# Patient Record
Sex: FEMALE | Race: WHITE | NOT HISPANIC OR LATINO | URBAN - METROPOLITAN AREA
[De-identification: names, ages, dates, MRNs, and addresses within clinical notes are randomized per-mention and may not be internally consistent; named-entity substitution may affect disease eponyms.]

---

## 2024-07-18 ENCOUNTER — HOSPITAL ENCOUNTER (EMERGENCY)
Facility: CLINIC | Age: 39
Discharge: OTHER INSTITUTION | End: 2024-07-18
Attending: EMERGENCY MEDICINE | Admitting: EMERGENCY MEDICINE
Payer: COMMERCIAL

## 2024-07-18 VITALS
RESPIRATION RATE: 18 BRPM | WEIGHT: 108 LBS | TEMPERATURE: 97.6 F | HEIGHT: 62 IN | DIASTOLIC BLOOD PRESSURE: 52 MMHG | OXYGEN SATURATION: 100 % | SYSTOLIC BLOOD PRESSURE: 89 MMHG | HEART RATE: 59 BPM | BODY MASS INDEX: 19.88 KG/M2

## 2024-07-18 DIAGNOSIS — F14.10 COCAINE ABUSE (H): ICD-10-CM

## 2024-07-18 DIAGNOSIS — F11.20 OPIOID USE DISORDER, SEVERE, DEPENDENCE (H): ICD-10-CM

## 2024-07-18 PROCEDURE — 99282 EMERGENCY DEPT VISIT SF MDM: CPT | Performed by: EMERGENCY MEDICINE

## 2024-07-18 PROCEDURE — 99283 EMERGENCY DEPT VISIT LOW MDM: CPT | Performed by: EMERGENCY MEDICINE

## 2024-07-18 ASSESSMENT — COLUMBIA-SUICIDE SEVERITY RATING SCALE - C-SSRS
1. IN THE PAST MONTH, HAVE YOU WISHED YOU WERE DEAD OR WISHED YOU COULD GO TO SLEEP AND NOT WAKE UP?: NO
2. HAVE YOU ACTUALLY HAD ANY THOUGHTS OF KILLING YOURSELF IN THE PAST MONTH?: NO
6. HAVE YOU EVER DONE ANYTHING, STARTED TO DO ANYTHING, OR PREPARED TO DO ANYTHING TO END YOUR LIFE?: NO

## 2024-07-19 NOTE — ED TRIAGE NOTES
Patient presents seeking detox from heroin and fentanyl and crack sometimes. Patient was supposed to report to [facility] for treatment today. Patient was told she would have to seek detox prior to admission to treatment. Patient is currently snorts and shoots about 5 times a day. Patient reports history seizures; last seizure 1 year ago. Patient vaping nicotine daily. Patient last used 2 hours prior to arrival     Triage Assessment (Adult)       Row Name 07/18/24 1915          Triage Assessment    Airway WDL WDL        Respiratory WDL    Respiratory WDL WDL        Skin Circulation/Temperature WDL    Skin Circulation/Temperature WDL WDL        Cardiac WDL    Cardiac WDL WDL        Peripheral/Neurovascular WDL    Peripheral Neurovascular WDL WDL        Cognitive/Neuro/Behavioral WDL    Cognitive/Neuro/Behavioral WDL WDL

## 2024-07-19 NOTE — ED PROVIDER NOTES
"    [facility] EMERGENCY DEPARTMENT ([facility])    7/18/24       ED PROVIDER NOTE       History     Chief Complaint   Patient presents with    Drug / Alcohol Assessment     Patient presents seeking detox from heroin and fentanyl and crack sometimes. Patient was supposed to report to [facility] for treatment today. Patient was told she would have to seek detox prior to admission to treatment. Patient is currently snorts and shoots about 5 times a day. Patient reports history seizures; last seizure 1 year ago. Patient vaping nicotine daily.      HPI    Re Dc is a 38 year old female who presents to the Emergency Department today requesting detox.  Patient states that she needs detox from Xylazine which she believes probably also contains fentanyl.  She has been using \"6 bags a day\" of xylazine and fentanyl for about the past 3 months.  She sometimes snorts it and sometimes shoots it.  Last use was about 2 hours prior to arrival.  She also uses crack cocaine and will use about 6 times per day, she will snort it generally.  Occasionally she will smoke it.  Patient reports that she had 4 months of sobriety but then relapsed and has been using for the past 3 months.  She tried to get into a treatment program through [facility] today and she was told that she would need detox.  They told her that she could potentially go to various outpatient programs but they also gave her the number of [place] and told her to come here for inpatient detox.  Patient denies use of benzos and denies any alcohol use.    Patient admits to anxiety and depression, denies any medication, denies any SI or HI.  No auditory hallucinations.      Past Medical History  No past medical history on file.  No past surgical history on file.  No current outpatient medications on file.    Allergies   Allergen Reactions    Sulfa Antibiotics      Family History  No family history on file.  Social History       Past medical history, past " "surgical history, medications, allergies, family history, and social history were reviewed with the patient. No additional pertinent items.     A medically appropriate review of systems was performed with pertinent positives and negatives noted in the HPI, and all other systems negative.    Physical Exam   BP: (!) 79/50  Pulse: 59  Temp: 97.6  F (36.4  C)  Resp: 18  Height: 157.5 cm (5' 2\")  Weight: 49 kg (108 lb)  SpO2: 100 %  Physical Exam  Vitals and nursing note reviewed.   Constitutional:       General: She is not in acute distress.     Appearance: She is not diaphoretic.      Comments: Adult female, sitting back in bed, intermittently nodding off, will immediately wake up and continues to interact appropriately.  No acute distress.   HENT:      Head: Atraumatic.      Mouth/Throat:      Mouth: Mucous membranes are moist.      Pharynx: Oropharynx is clear. No oropharyngeal exudate.   Eyes:      General: No scleral icterus.     Pupils: Pupils are equal, round, and reactive to light.   Cardiovascular:      Rate and Rhythm: Normal rate.      Pulses: Normal pulses.      Heart sounds: Normal heart sounds. No murmur heard.  Pulmonary:      Effort: No respiratory distress.      Breath sounds: Normal breath sounds.   Abdominal:      General: Bowel sounds are normal.      Palpations: Abdomen is soft.      Tenderness: There is no abdominal tenderness.   Musculoskeletal:         General: No tenderness.   Skin:     General: Skin is warm.      Findings: No rash.   Neurological:      Mental Status: She is alert.   Psychiatric:      Comments: Patient is awake, intermittently nodding off, she will immediately arouse.  Not agitated or aggressive.  Somewhat flat affect.  No SI/HI.  No AH.         ED Course, Procedures, & Data      Procedures               No results found for any visits on 07/18/24.  Medications - No data to display  Labs Ordered and Resulted from Time of ED Arrival to Time of ED Departure - No data to " display  No orders to display          Critical care was not performed.     Medical Decision Making  The patient's presentation was of high complexity (a chronic illness severe exacerbation, progression, or side effect of treatment).    The patient's evaluation involved:  history and exam without other MDM data elements    The patient's management necessitated moderate risk (patient did present hypotensive, asymptomatic with this, allowed 1 blood pressure recheck but would not wait to see if this came up with fluids).    Assessment & Plan    Patient presents for the above complaints.  On my evaluation she appears somewhat sleepy, clearly under the influence.  She is falling asleep during exam.  She is answering questions, however her family member who is with her is answering most of the questions.    Initial blood pressure is 79/50.  Pulse is 59.  She denies any dizziness, nausea/vomiting/diarrhea, or other physical complaints at this time.  No fevers, no other complaints.  Suspect that her hypotension is probably from her recent substance use/CNS depressant effect of substances.  We did repeat her blood pressure here in the emergency department and this is 89/52.  She does not feel dizzy and does not have any complaints.  We did give her some water here to drink, she is up and ambulating.  She is not interested in staying to have us recheck the blood pressure again in several minutes.    I did tell the patient that we cannot detox from opiates on an inpatient basis here.  Her mom objected a couple of times, stating that they were told to come here and that they called here and they were told they could come here for inpatient opiate detox.  I did tell them that we do not admit for inpatient opiate detox any longer.  I did recommend outpatient programs, she reports that [facility] was mentioned to her.  I did offer to call [facility] or [facility] for her, she states she would rather do it herself.  I will give her  those phone numbers, will also give her the phone number to the addiction medicine clinic/bridge clinic.    Again, we did recommend that the patient drink some oral fluids and we recheck her blood pressure, but she insists that she wants to go.  She is asymptomatic.    I have reviewed the nursing notes. I have reviewed the findings, diagnosis, plan and need for follow up with the patient.    There are no discharge medications for this patient.      Final diagnoses:   Opioid use disorder, severe, dependence (H)   Cocaine abuse (H)   I, Katty Donald, am serving as a trained medical scribe to document services personally performed by Bernadette Harrison MD based on the provider's statements to me on July 18, 2024.  This document has been checked and approved by the attending provider.    I, Bernadette Harrison MD, was physically present and have reviewed and verified the accuracy of this note documented by Katty Donald, medical scribe.      Bernadette Harrison MD  [facility] EMERGENCY DEPARTMENT  7/18/2024     Bernadette Harrison MD  07/18/24 2006

## 2024-07-19 NOTE — DISCHARGE INSTRUCTIONS
You have been seen in the emergency department today for detox.  The hospital does not do inpatient detox for opiates.  We recommend that you follow-up with an outpatient program, see resources below.    We have also given you the phone number to the addiction medicine clinic which is located here at [facility], please see their phone number below.    Addiction Medicine Clinic:    [phone]    The addiction medicine clinic does have walk-in hours Monday through Friday.      Substance Use Disorder Direct Access Resources - Community Resources    It is recommended that you abstain from all mood altering chemicals. Please contact the sober support hotline ([phone]) as needed; phones are answered 24 hours a day, 7 days a week.    To access substance use treatment you must have a comprehensive assessment completed to begin any treatment program.     If uninsured, please contact your county of residence for eligibility screen to substance use disorder evaluation and treatment:    [place] - [phone]   [facility] [phone]   [facility] [phone]   [place] - [phone]   [facility] [phone]   [facility] [phone]   [facility] [phone]   [place] - [phone]     If you have private insurance, call the customer service number on the back of your insurance card to find an in-network substance abuse use disorder assessment. The ideal provider will be a treatment facility, licensed in the [facility].     Community LELE Evaluations: Clients may call their county for a full list of providers - Availability and services listed belo are subject to change, please call the provider to confirm    [facility]  [phone] [address], [place], [address]  *Please call the above number to schedule a comprehensive assessment for determination of level of care needs. In person and virtual appointments available Mon-Fri.    [facility], [address], First Floor, [address], [place] [address] (next to the outpatient lab)    Phone: [phone]   Provides bridging services to people with Opiate Use Disorders (OUD) seeking care. This is a front door to Medication Assisted Treatments (MAT), ages 16+  Walk In hours: Monday-Friday 9:00am-3:00pm    [facility]  [phone]  Walk in Assessments: Mon-Friday 7a-1:45p  [address], [place], [address]    [facility]  Central Access [phone]  [address] [place], [place], [address]  *by appointment only    [facility]  [phone] (phone consultation available )  Locations in: [place], [facility], and [place], [place]  [place] virtual IOP [phone] or visit [email or web address]   Also offers LGBTQ programming     [facility]  [phone]  [address]  [place], [place], [address]  *Currently only offered via telehealth - call to set up an appointment    [facility]  [address] [place] [address], [address]  [facility]  For more information to to make a referral call:  [phone]  Walk-in on   9-11 a.m.    [facility]  [phone]  [address] [place], [place], [address]  *available by appointments only    [facility] specific  [phone]  [address] [place], [place], [address]  *available by appointment only    [facility]  [phone]  [address] [place], [place], [address]  *walk in assessments available M-F starting at 7 am.    [facility]  [phone]  Locations: [facility], [facility], and [place]  *Walk in assessments availble M-F starting at 8 am -virtual only    [facility]  [phone]  [address] [place] [address]    [facility]  Virtual + Locations: [place], [place], [place], [place], [facility], [facility],  [facility]   [phone]  *available by appointment only    [facility]  [phone]  [address]  [place], [place], [address]    [facility]  [phone]  [address] [place], [place], [address]  *available by appointment    [facility]  [phone]  [address]  [place], [place], [address]  *walk ins available M-TH from 9-3    [facility] program: [phone]  [address] [place], [place], [address]    [place]  [phone]  Same day substance use disorder assessments are available Monday - Friday, via walk-in or by appointment at the [place] location.  [facility] [employer], [address], [place] [address]     [facility] - adolescent and adult SUDs services  [phone]  Offer services Monday through Friday, as well as evening hours Monday through Thursday. Normally, a first appointment will be scheduled within one week  [email or web address]  Locations all over [place]    If you are intoxicated, you may be required to detox at a detox facility before starting treatment. The following are detox facilities that you can self present to. All detox facilities are able to help you complete an assessment prior to discharge if you choose:      [facility]: [address] [place], [place], [address].         [phone]    [facility]: [address] [place], [place], [address]  [phone]     [facility]): [address] [place], [place], [address]  [phone]     [facility]: [address], [place], [address], [phone]         Ways to help cope with sobriety:    -- Take prescribed medicines as scheduled  -- Keep follow-up appointments  -- Talk to others about your concerns  -- Get regular exercise  -- Practice deep breathing skills  -- Eat a healthy diet  -- Use community resources, including hotline numbers, [facility] and  support meetings  -- Stay sober and avoid places/people/things associated with substance use  --Maintain a daily schedule/routine  --Get at least 7-8 hours of sleep per night  --Create a list 10--20 healthy activities that you can do that are enjoyable and do not involve substance use  --Create daily goals (approx. 1-4 goals) per day and work to achieve them throughout the day.       Free Resources:    [facility] ([facility])  [facility] connects people seeking recovery to resources that help foster and sustain long-term recovery. Whether you are seeking resources for treatment, transportation, housing, job training, education, health care or other pathways to recovery, [facility] is a great place to start.  Phone: 110.483.2843. www.[place]"[employer] (Great listing of all types of recovery and non-recovery related resources)    Alcoholics Anonymous  Phone: 6-535-ALCOHOL  Website: HTTP://WWW.AA.ORG/  [facility] [place] (708-841-3365 or http://aaMedaxion.org)  [facility] [place] (566-732-8628 or www.aasaul.org)     Narcotics Anonymous  Phone: 753.796.6763  Website: www.[employer].    [facility] [address], [place], [place],  Phone: [phone]  Drop-in Hours: Monday-Friday 9-11:30 am. By appointment at other times.  Provides: [facility] is a drop-in center on the [place] [address] [facility] that provides a safe space for individuals who are homeless and have a history of chemical use. Sobriety is not a requirement but drugs and alcohol are not allowed on the property.  Services: Non-clients can access drop-in services such as Recovery and Harm Reduction Groups, referrals to case management, community activities, shower facilities, and a pool table. Individuals who are homeless and have chemical health needs may be eligible for enrollment into [facility]'s case management program. Clients and  work together to access benefits, treatment, health care, shelter, and  external housing resources.

## 2024-11-20 ENCOUNTER — HOSPITAL ENCOUNTER (EMERGENCY)
Facility: CLINIC | Age: 39
Discharge: HOME | End: 2024-11-20
Attending: STUDENT IN AN ORGANIZED HEALTH CARE EDUCATION/TRAINING PROGRAM | Admitting: STUDENT IN AN ORGANIZED HEALTH CARE EDUCATION/TRAINING PROGRAM
Payer: COMMERCIAL

## 2024-11-20 VITALS
SYSTOLIC BLOOD PRESSURE: 145 MMHG | DIASTOLIC BLOOD PRESSURE: 104 MMHG | TEMPERATURE: 98.3 F | RESPIRATION RATE: 18 BRPM | OXYGEN SATURATION: 99 % | HEART RATE: 104 BPM

## 2024-11-20 DIAGNOSIS — L03.012 PARONYCHIA OF LEFT THUMB: ICD-10-CM

## 2024-11-20 PROCEDURE — 99284 EMERGENCY DEPT VISIT MOD MDM: CPT | Mod: 25 | Performed by: STUDENT IN AN ORGANIZED HEALTH CARE EDUCATION/TRAINING PROGRAM

## 2024-11-20 PROCEDURE — 99284 EMERGENCY DEPT VISIT MOD MDM: CPT | Performed by: STUDENT IN AN ORGANIZED HEALTH CARE EDUCATION/TRAINING PROGRAM

## 2024-11-20 PROCEDURE — 250N000009 HC RX 250: Performed by: STUDENT IN AN ORGANIZED HEALTH CARE EDUCATION/TRAINING PROGRAM

## 2024-11-20 PROCEDURE — 10060 I&D ABSCESS SIMPLE/SINGLE: CPT | Performed by: STUDENT IN AN ORGANIZED HEALTH CARE EDUCATION/TRAINING PROGRAM

## 2024-11-20 PROCEDURE — 250N000013 HC RX MED GY IP 250 OP 250 PS 637: Performed by: STUDENT IN AN ORGANIZED HEALTH CARE EDUCATION/TRAINING PROGRAM

## 2024-11-20 RX ORDER — CEPHALEXIN 500 MG/1
500 CAPSULE ORAL ONCE
Status: COMPLETED | OUTPATIENT
Start: 2024-11-20 | End: 2024-11-20

## 2024-11-20 RX ORDER — DOXYCYCLINE 100 MG/1
100 CAPSULE ORAL 2 TIMES DAILY
Qty: 20 CAPSULE | Refills: 0 | Status: SHIPPED | OUTPATIENT
Start: 2024-11-20 | End: 2024-11-30

## 2024-11-20 RX ORDER — IBUPROFEN 600 MG/1
600 TABLET, FILM COATED ORAL EVERY 6 HOURS PRN
Qty: 120 TABLET | Refills: 0 | Status: SHIPPED | OUTPATIENT
Start: 2024-11-20 | End: 2024-12-20

## 2024-11-20 RX ORDER — DOXYCYCLINE 100 MG/1
100 CAPSULE ORAL ONCE
Status: COMPLETED | OUTPATIENT
Start: 2024-11-20 | End: 2024-11-20

## 2024-11-20 RX ORDER — IBUPROFEN 600 MG/1
600 TABLET, FILM COATED ORAL ONCE
Status: COMPLETED | OUTPATIENT
Start: 2024-11-20 | End: 2024-11-20

## 2024-11-20 RX ORDER — CEPHALEXIN 500 MG/1
500 CAPSULE ORAL 4 TIMES DAILY
Qty: 40 CAPSULE | Refills: 0 | Status: SHIPPED | OUTPATIENT
Start: 2024-11-20 | End: 2024-11-30

## 2024-11-20 RX ADMIN — DOXYCYCLINE HYCLATE 100 MG: 100 CAPSULE ORAL at 21:03

## 2024-11-20 RX ADMIN — CEPHALEXIN 500 MG: 500 CAPSULE ORAL at 21:03

## 2024-11-20 RX ADMIN — LIDOCAINE HYDROCHLORIDE 10 ML: 10 INJECTION, SOLUTION EPIDURAL; INFILTRATION; INTRACAUDAL; PERINEURAL at 21:04

## 2024-11-20 RX ADMIN — IBUPROFEN 600 MG: 600 TABLET, FILM COATED ORAL at 21:03

## 2024-11-20 ASSESSMENT — COLUMBIA-SUICIDE SEVERITY RATING SCALE - C-SSRS
6. HAVE YOU EVER DONE ANYTHING, STARTED TO DO ANYTHING, OR PREPARED TO DO ANYTHING TO END YOUR LIFE?: NO
1. IN THE PAST MONTH, HAVE YOU WISHED YOU WERE DEAD OR WISHED YOU COULD GO TO SLEEP AND NOT WAKE UP?: NO
2. HAVE YOU ACTUALLY HAD ANY THOUGHTS OF KILLING YOURSELF IN THE PAST MONTH?: NO

## 2024-11-20 ASSESSMENT — ACTIVITIES OF DAILY LIVING (ADL): ADLS_ACUITY_SCORE: 0

## 2024-11-21 NOTE — ED TRIAGE NOTES
Pt said they noticed thumb swelling today and thumb appears w ingrown nail, also having nose swelling     Triage Assessment (Adult)       Row Name 11/20/24 1944          Triage Assessment    Airway WDL WDL        Respiratory WDL    Respiratory WDL WDL        Skin Circulation/Temperature WDL    Skin Circulation/Temperature WDL WDL        Cardiac WDL    Cardiac WDL WDL        Peripheral/Neurovascular WDL    Peripheral Neurovascular WDL WDL        Cognitive/Neuro/Behavioral WDL    Cognitive/Neuro/Behavioral WDL WDL

## 2024-11-21 NOTE — ED PROVIDER NOTES
[place] EMERGENCY DEPARTMENT ([facility])    11/20/24       ED PROVIDER NOTE       History     Chief Complaint   Patient presents with    Wound Check     HPI  Re Dc is a 39 year old female with a history of polysubstance use who presents to the ED with left thumb pain.     Patient reports wearing fake nails and noticing left thumb swelling and pain yesterday. Patient denies daily medications.  Denies fevers, chills, nausea vomiting or any other symptoms    Past Medical History  No past medical history on file.  No past surgical history on file.  No current outpatient medications on file.    Allergies   Allergen Reactions    Sulfa Antibiotics      Family History  No family history on file.  Social History       Past medical history, past surgical history, medications, allergies, family history, and social history were reviewed with the patient. No additional pertinent items.     A complete review of systems was performed with pertinent positives and negatives noted in the HPI, and all other systems negative.    Physical Exam   BP: (!) 145/104  Pulse: 104  Temp: 98.3  F (36.8  C)  Resp: 18  SpO2: 99 %  Physical Exam  Vitals and nursing note reviewed.   Constitutional:       General: She is not in acute distress.     Appearance: Normal appearance. She is not diaphoretic.   HENT:      Head: Atraumatic.      Mouth/Throat:      Mouth: Mucous membranes are moist.   Eyes:      General: No scleral icterus.     Conjunctiva/sclera: Conjunctivae normal.   Cardiovascular:      Rate and Rhythm: Normal rate.      Heart sounds: Normal heart sounds.   Pulmonary:      Effort: No respiratory distress.      Breath sounds: Normal breath sounds.   Abdominal:      General: Abdomen is flat.   Musculoskeletal:      Cervical back: Neck supple.      Comments: Left thumb with paronychia, pus under skin, distally intact neurovascularly, able to range thumb   Skin:     General: Skin is warm.      Findings: No rash.    Neurological:      Mental Status: She is alert.           ED Course, Procedures, & Data      [facility]    PROCEDURE: -Incision/Drainage    Date/Time: 11/20/2024 9:01 PM    Performed by: Moe Osorio MD  Authorized by: Moe Osorio MD    Risks, benefits and alternatives discussed.      LOCATION:      Type:  Abscess    Location:  Upper extremity    Upper extremity location:  Finger    Finger location:  L thumb    PRE-PROCEDURE DETAILS:     Skin preparation:  Chloraprep    PROCEDURE TYPE:     Complexity:  Simple    ANESTHESIA (see MAR for exact dosages):     Anesthesia method:  Local infiltration    Local anesthetic:  Lidocaine 1% w/o epi    PROCEDURE DETAILS:     Needle aspiration: yes      Needle size:  18 G    Incision types:  Stab incision    Incision depth:  Subungual    Drainage:  Purulent    Drainage amount:  Moderate    Wound treatment:  Wound left open    Packing materials:  None    PROCEDURE    Patient Tolerance:  Patient tolerated the procedure well with no immediate complications                  No results found for any visits on 11/20/24.  Medications   lidocaine 1 % 10 mL (has no administration in time range)     Labs Ordered and Resulted from Time of ED Arrival to Time of ED Departure - No data to display  No orders to display          Critical care was not performed.     Medical Decision Making  The patient's presentation was of moderate complexity (an acute complicated injury).    The patient's evaluation involved:  review of external note(s) from 2 sources (see separate area of note for details)  review of 2 test result(s) ordered prior to this encounter (see separate area of note for details)    The patient's management necessitated moderate risk (a decision regarding minor procedure (incision & drainage) with risk factors of polysubstance use).    Assessment & Plan    Patient arrived with paronychia  Paronychia drained per procedure note  above, uncomplicated, patient tolerated procedure well  Post procedure, nursing coming back to bedside to look at this color of the patient's skin, but appears that she had some blood that went underneath the skin in the area where there was previously pus, no evidence of ischemia at this point, and lidocaine without epinephrine was used, patient then otherwise well-appearing thumb, continue to do well after procedure  Ordered Keflex and doxycycline here in the emergency department, and prescribed it to home as patient has a sulfa allergy  Patient also requested ibuprofen which was prescribed  Patient discharged home with strict return precautions, antibiotics, instructions to follow-up with primary care which we have set up for    I have reviewed the nursing notes. I have reviewed the findings, diagnosis, plan and need for follow up with the patient.    New Prescriptions    No medications on file       Final diagnoses:   None   IKatty, am serving as a trained medical scribe to document services personally performed by Moe Osorio MD based on the provider's statements to me on November 20, 2024.  This document has been checked and approved by the attending provider.    IMoe MD, was physically present and have reviewed and verified the accuracy of this note documented by Katty Donald, medical scribe.      Moe Osorio MD   [facility] EMERGENCY DEPARTMENT  11/20/2024     Moe Osorio MD  11/20/24 2106

## 2024-11-21 NOTE — DISCHARGE INSTRUCTIONS
Here in the emergency department you were found to have infection of your left thumb  We have drained the infection and given you antibiotics and also prescribed you the same antibiotics to take home  If you develop any new or worsening symptoms including but not limited to pain, weakness, fevers, chills please return immediately to the emergency room  Otherwise we have also made you a primary care follow-up visit and encourage you to follow-up as scheduled  I encourage you to soak your thumb as it is healing, approximately 2-3 times a day if possible